# Patient Record
Sex: FEMALE | Race: WHITE | Employment: FULL TIME | URBAN - METROPOLITAN AREA
[De-identification: names, ages, dates, MRNs, and addresses within clinical notes are randomized per-mention and may not be internally consistent; named-entity substitution may affect disease eponyms.]

---

## 2017-01-19 ENCOUNTER — HOSPITAL ENCOUNTER (EMERGENCY)
Age: 37
Discharge: HOME | End: 2017-01-19
Attending: EMERGENCY MEDICINE | Admitting: EMERGENCY MEDICINE
Payer: COMMERCIAL

## 2017-01-19 ENCOUNTER — APPOINTMENT (OUTPATIENT)
Dept: CT IMAGING | Age: 37
End: 2017-01-19
Attending: EMERGENCY MEDICINE
Payer: COMMERCIAL

## 2017-01-19 VITALS
HEART RATE: 81 BPM | DIASTOLIC BLOOD PRESSURE: 74 MMHG | RESPIRATION RATE: 16 BRPM | TEMPERATURE: 98.3 F | WEIGHT: 215 LBS | SYSTOLIC BLOOD PRESSURE: 132 MMHG | BODY MASS INDEX: 36.9 KG/M2 | OXYGEN SATURATION: 99 %

## 2017-01-19 DIAGNOSIS — J03.90 ACUTE TONSILLITIS, UNSPECIFIED ETIOLOGY: Primary | ICD-10-CM

## 2017-01-19 LAB
ANION GAP BLD CALC-SCNC: 18 MMOL/L (ref 10–20)
BUN BLD-MCNC: 4 MG/DL (ref 7–18)
CA-I BLD-MCNC: 1.13 MMOL/L (ref 1.12–1.32)
CHLORIDE BLD-SCNC: 107 MMOL/L (ref 100–108)
CO2 BLD-SCNC: 22 MMOL/L (ref 19–24)
CREAT UR-MCNC: 0.7 MG/DL (ref 0.6–1.3)
GLUCOSE BLD STRIP.AUTO-MCNC: 88 MG/DL (ref 74–106)
HCG UR QL: NEGATIVE
HCT VFR BLD CALC: 42 % (ref 36–49)
HGB BLD-MCNC: 14.3 G/DL (ref 12–16)
POTASSIUM BLD-SCNC: 3.9 MMOL/L (ref 3.5–5.5)
SODIUM BLD-SCNC: 142 MMOL/L (ref 136–145)

## 2017-01-19 PROCEDURE — 81025 URINE PREGNANCY TEST: CPT

## 2017-01-19 PROCEDURE — 99284 EMERGENCY DEPT VISIT MOD MDM: CPT

## 2017-01-19 PROCEDURE — 87081 CULTURE SCREEN ONLY: CPT | Performed by: EMERGENCY MEDICINE

## 2017-01-19 PROCEDURE — 74011250637 HC RX REV CODE- 250/637: Performed by: EMERGENCY MEDICINE

## 2017-01-19 PROCEDURE — 80047 BASIC METABLC PNL IONIZED CA: CPT

## 2017-01-19 PROCEDURE — 74011636320 HC RX REV CODE- 636/320: Performed by: EMERGENCY MEDICINE

## 2017-01-19 PROCEDURE — 70491 CT SOFT TISSUE NECK W/DYE: CPT

## 2017-01-19 RX ORDER — ACETAMINOPHEN 325 MG/1
TABLET ORAL
COMMUNITY

## 2017-01-19 RX ORDER — IBUPROFEN 200 MG
TABLET ORAL
COMMUNITY

## 2017-01-19 RX ORDER — AMOXICILLIN 500 MG/1
TABLET, FILM COATED ORAL
COMMUNITY

## 2017-01-19 RX ORDER — DEXAMETHASONE SODIUM PHOSPHATE 4 MG/ML
10 INJECTION, SOLUTION INTRA-ARTICULAR; INTRALESIONAL; INTRAMUSCULAR; INTRAVENOUS; SOFT TISSUE ONCE
Status: COMPLETED | OUTPATIENT
Start: 2017-01-19 | End: 2017-01-19

## 2017-01-19 RX ADMIN — IOPAMIDOL 80 ML: 612 INJECTION, SOLUTION INTRAVENOUS at 16:10

## 2017-01-19 RX ADMIN — DEXAMETHASONE SODIUM PHOSPHATE 10 MG: 4 INJECTION, SOLUTION INTRAMUSCULAR; INTRAVENOUS at 13:51

## 2017-01-19 NOTE — ED PROVIDER NOTES
HPI Comments: 1:19 PM Ken Villegas is a 39 y.o. female with h/o PCOS who presents to ED complaining of sore throat onset 1 week ago. Pt states she was sick 1.5 weeks ago with sx of fatigue and frontal HA. She dismissed this as a sinus infection. 1 week ago, she noticed swelling around her neck. She visited Dr. Hernando Bridges at [facility] 2 days ago. Dr. Hernando Bridges sent pt here for CT of the neck because he was concerned about a peritonsillar abscess. Pt has been taking Amoxicillin for 2 days. No other concerns or symptoms at this time. PCP: Genie Vega MD    The history is provided by the patient. Past Medical History:   Diagnosis Date    ADHD (attention deficit hyperactivity disorder)     PCOS (polycystic ovarian syndrome) 2000       Past Surgical History:   Procedure Laterality Date    Hx gyn           Family History:   Problem Relation Age of Onset    Cancer Mother     Elevated Lipids Mother     Psychiatric Disorder Father     Cancer Father     Diabetes Maternal Grandmother     Psychiatric Disorder Maternal Grandfather     Alcohol abuse Paternal Grandmother     Lung Disease Paternal Grandmother     No Known Problems Brother        Social History     Social History    Marital status: SINGLE     Spouse name: N/A    Number of children: N/A    Years of education: N/A     Occupational History    Not on file. Social History Main Topics    Smoking status: Former Smoker    Smokeless tobacco: Never Used    Alcohol use 7.0 oz/week     14 Standard drinks or equivalent per week    Drug use: No    Sexual activity: Not Currently     Other Topics Concern    Not on file     Social History Narrative         ALLERGIES: Codeine; Corn oil; Peanut oil; Percocet [oxycodone-acetaminophen]; Sesame oil; and Vicodin [hydrocodone-acetaminophen]    Review of Systems   Constitutional: Positive for fatigue. Negative for chills and fever. HENT: Positive for sore throat.     Respiratory: Negative for shortness of breath. Cardiovascular: Negative for chest pain. Gastrointestinal: Negative for diarrhea and vomiting. Musculoskeletal: Positive for neck pain. Skin: Negative for rash. Neurological: Positive for headaches. All other systems reviewed and are negative. Vitals:    01/19/17 1317   BP: (!) 148/99   Pulse: 85   Resp: 16   Temp: 98.3 °F (36.8 °C)   SpO2: 100%   Weight: 97.5 kg (215 lb)            Physical Exam   Constitutional: She is oriented to person, place, and time. She appears well-developed and well-nourished. No distress. HENT:   Head: Normocephalic and atraumatic. L tonsil w/ exudate. Uvula midline and symmetric. Eyes: No scleral icterus. Cardiovascular: Normal rate. Pulmonary/Chest: Effort normal. No respiratory distress. She has no wheezes. She has no rales. Lymphadenopathy:     She has cervical adenopathy (tender L submandibular node). Neurological: She is alert and oriented to person, place, and time. Skin: Skin is warm and dry. Psychiatric: She has a normal mood and affect. Nursing note and vitals reviewed. MDM  Number of Diagnoses or Management Options  Acute tonsillitis, unspecified etiology:   Blood pressure elevated:   Diagnosis management comments: IMP: Acute tonsilittis, referred for strep test and CT by PCP. On appropriate ABX. ED Course       Procedures    Vitals:  Patient Vitals for the past 12 hrs:   Temp Pulse Resp BP SpO2   01/19/17 1317 98.3 °F (36.8 °C) 85 16 (!) 148/99 100 %   100% on RA, indicating adequate oxygenation.       Medications ordered:   Medications   dexamethasone (DECADRON) 4 mg/mL injection 10 mg (10 mg Oral Given 1/19/17 1351)   iopamidol (ISOVUE 300) 61 % contrast injection 100 mL (80 mL IntraVENous Given 1/19/17 1610)         Lab findings:  Recent Results (from the past 12 hour(s))   STREP THROAT SCREEN    Collection Time: 01/19/17  1:54 PM   Result Value Ref Range    Special Requests: NO SPECIAL REQUESTS      Strep Screen NEGATIVE       Culture result: PENDING    POC CHEM8    Collection Time: 01/19/17  3:16 PM   Result Value Ref Range    CO2 (POC) 22 19 - 24 MMOL/L    Glucose (POC) 88 74 - 106 MG/DL    BUN (POC) 4 (L) 7 - 18 MG/DL    Creatinine (POC) 0.7 0.6 - 1.3 MG/DL    GFR-AA (POC) >60 >60 ml/min/1.73m2    GFR, non-AA (POC) >60 >60 ml/min/1.73m2    Sodium (POC) 142 136 - 145 MMOL/L    Potassium (POC) 3.9 3.5 - 5.5 MMOL/L    Calcium, ionized (POC) 1.13 1.12 - 1.32 MMOL/L    Chloride (POC) 107 100 - 108 MMOL/L    Anion gap (POC) 18 10 - 20      Hematocrit (POC) 42 36 - 49 %    Hemoglobin (POC) 14.3 12 - 16 G/DL   HCG URINE, QL. - POC    Collection Time: 01/19/17  3:18 PM   Result Value Ref Range    Pregnancy test,urine (POC) NEGATIVE  NEG         X-Ray, CT or other radiology findings or impressions:  CT NECK SOFT TISSUE W CONT   Final Result   Impression:     1. Enlarged tonsils bilaterally with left significantly larger than right and mild left-sided edema. No focal abscess. This does result in narrowing of the nasal and oropharynx. 2. Significant cervical adenopathy, left significantly greater than right. This is likely reactive, however recommend clinical correlation and short interval follow-up to ensure resolution. Interpreted by radiologist 16:41       Orders:  Orders Placed This Encounter    STREP THROAT SCREEN     Standing Status:   Standing     Number of Occurrences:   1    CT NECK SOFT TISSUE W CONT     Standing Status:   Standing     Number of Occurrences:   1     Order Specific Question:   Transport     Answer:   Ambulatory [1]     Order Specific Question:   Is Patient Pregnant? Answer:   No     Order Specific Question:   Is Patient Allergic to Contrast Dye? Answer:   Unknown     Order Specific Question:   Does patient have history of Renal Disease?      Answer:   No    POC CHEM8     Standing Status:   Standing     Number of Occurrences:   1    POC URINE PREGNANCY TEST Standing Status:   Standing     Number of Occurrences:   1    POC CHEM8     Standing Status:   Standing     Number of Occurrences:   1    HCG URINE, QL. - POC     Standing Status:   Standing     Number of Occurrences:   1    INSERT PERIPHERAL IV ONE TIME STAT     Standing Status:   Standing     Number of Occurrences:   1    amoxicillin 500 mg tab     Sig: Take  by mouth.  ibuprofen (MOTRIN IB) 200 mg tablet     Sig: Take  by mouth.  acetaminophen (TYLENOL) 325 mg tablet     Sig: Take  by mouth every four (4) hours as needed for Pain.  dexamethasone (DECADRON) 4 mg/mL injection 10 mg    iopamidol (ISOVUE 300) 61 % contrast injection 100 mL       Progress notes, Consult notes, or additional Procedure notes:   5:00 PM I have reassessed the patient and discussed their results and diagnosis. Pt will be discharged in stable condition. Patient is to return to emergency department if any new or worsening condition. Patient understands and verbalizes agreement with plan. Disposition:  Diagnosis:   1. Acute tonsillitis, unspecified etiology    2. Blood pressure elevated      1) Your CT scan did not reveal an abscess and was consistent with tonsilitis. There is swelling of the lymph nodes that drain the tonsils. 2) The strep test was negative  3) May continue Amoxicillin  (in case test results masked by use of antibiotics beforehand). 4) Decadron administered today will help with local swelling and throat pain  5) Continue Ibuprofen for fever and pain  6) Salt water gargles  7) See your PCP for recheck in 3-5 days if not improved.   8) Blood pressure elevated today and requires follow up    Disposition: Discharged home    Follow-up Information     Follow up With Details Comments Milka White MD Schedule an appointment as soon as possible for a visit As needed, If symptoms persist Dallas Schaefer [address] [place] [address]  [phone]             Patient's Medications   Start Taking    No medications on file   Continue Taking    ACETAMINOPHEN (TYLENOL) 325 MG TABLET    Take  by mouth every four (4) hours as needed for Pain. AMOXICILLIN 500 MG TAB    Take  by mouth. IBUPROFEN (MOTRIN IB) 200 MG TABLET    Take  by mouth. These Medications have changed    No medications on file   Stop Taking    AMPHETAMINE-DEXTROAMPHETAMINE XR (ADDERALL XR) 20 MG XR CAPSULE    Take 1 capsule by mouth every morning. CYCLOBENZAPRINE (FLEXERIL) 10 MG TABLET    Take 10 mg by mouth as needed for Muscle Spasm(s). ETHINYL ESTRADIOL-ETONOGESTREL (NUVARING) 0.12-0.015 MG/24 HR VAGINAL RING    by Intravaginally route. MELOXICAM (MOBIC) 7.5 MG TABLET    Take 1 tablet by mouth as needed for Pain. TRAMADOL (ULTRAM) 50 MG TABLET    Take 1 Tab by mouth every six (6) hours as needed for Pain. [id number] [facility] for and in the presence of Kamila Heredia MD (01/19/17)      Physician Attestation  I personally performed the services described in this documentation, reviewed and edited the documentation which was dictated to the scribe in my presence, and it accurately records my words and actions.     Kamila Heredia MD (01/19/17)      Signed by: Nisa Sousa, 01/19/17, 1:22 PM

## 2017-01-19 NOTE — ED NOTES
I have reviewed discharge instructions with the patient. The patient verbalized understanding. Patient armband removed and given to patient to take home. Patient was informed of the privacy risks if armband lost or stolen. Pt alert, oriented x4 and ambulatory out of ER in [facility] at this time. VSS.

## 2017-01-19 NOTE — ED TRIAGE NOTES
Sent from PCP for CT neck. Left sided swelling and sore throat x 1 1/2 weeks. Sunday noted lymph swelling/ has been on amoxicillin.

## 2017-01-19 NOTE — Clinical Note
1) Your CT scan did not reveal an abscess and was consistent with tonsilitis. There is swelling of the lymph nodes that drain the tonsils. 2) The strep test was negative 3) May continue Amoxicillin  (in case test results masked by use of antibiotics be christiano). 4) Decadron administered today will help with local swelling and throat pain 5) Continue Ibuprofen for fever and pain 6) Salt water gargles 7) See your PCP for recheck in 3-5 days if not improved.

## 2017-01-19 NOTE — DISCHARGE INSTRUCTIONS
Tonsillitis: Care Instructions  Your Care Instructions    Tonsillitis is an infection of the tonsils that is caused by bacteria or a virus. The tonsils are in the back of the throat and are part of the immune system. Tonsillitis typically lasts from a few days up to a couple of weeks. Tonsillitis caused by a virus goes away on its own. Tonsillitis caused by the bacteria that causes strep throat is treated with antibiotics. You and your doctor may consider surgery to remove the tonsils (tonsillectomy) if you have serious complications or repeat infections. Follow-up care is a key part of your treatment and safety. Be sure to make and go to all appointments, and call your doctor if you are having problems. It's also a good idea to know your test results and keep a list of the medicines you take. How can you care for yourself at home? · If your doctor prescribed antibiotics, take them as directed. Do not stop taking them just because you feel better. You need to take the full course of antibiotics. · Gargle with warm salt water. This helps reduce swelling and relieve discomfort. Gargle once an hour with 1 teaspoon of salt mixed in 8 fluid ounces of warm water. · Take an over-the-counter pain medicine, such as acetaminophen (Tylenol), ibuprofen (Advil, Motrin), or naproxen (Aleve). Be safe with medicines. Read and follow all instructions on the label. No one younger than 20 should take aspirin. It has been linked to Reye syndrome, a serious illness. · Be careful when taking over-the-counter cold or flu medicines and Tylenol at the same time. Many of these medicines have acetaminophen, which is Tylenol. Read the labels to make sure that you are not taking more than the recommended dose. Too much acetaminophen (Tylenol) can be harmful. · Try an over-the-counter throat spray to relieve throat pain. · Drink plenty of fluids. Fluids may help soothe an irritated throat.  Drink warm or cool liquids (whichever feels better). These include tea, soup, and juice. · Do not smoke, and avoid secondhand smoke. Smoking can make tonsillitis worse. If you need help quitting, talk to your doctor about stop-smoking programs and medicines. These can increase your chances of quitting for good. · Use a vaporizer or humidifier to add moisture to your bedroom. Follow the directions for cleaning the machine. When should you call for help? Call your doctor now or seek immediate medical care if:  · Your pain gets worse on one side of your throat. · You have a new or higher fever. · You notice changes in your voice. · You have trouble opening your mouth. · You have any trouble breathing. · You have much more trouble swallowing. · You have a fever with a stiff neck or a severe headache. · You are sensitive to light or feel very sleepy or confused. Watch closely for changes in your health, and be sure to contact your doctor if:  · You do not get better after 2 days. Where can you learn more? Go to http://dilip-katharine.info/. Enter U699 in the search box to learn more about \"Tonsillitis: Care Instructions. \"  Current as of: July 29, 2016  Content Version: 11.1  © 8621-9684 [employer]. Care instructions adapted under license by [employer] (which disclaims liability or warranty for this information). If you have questions about a medical condition or this instruction, always ask your healthcare professional. [place] [address] any warranty or liability for your use of this information.

## 2017-01-21 LAB
B-HEM STREP THROAT QL CULT: NEGATIVE
BACTERIA SPEC CULT: NORMAL
SERVICE CMNT-IMP: NORMAL